# Patient Record
Sex: MALE | Race: BLACK OR AFRICAN AMERICAN | URBAN - METROPOLITAN AREA
[De-identification: names, ages, dates, MRNs, and addresses within clinical notes are randomized per-mention and may not be internally consistent; named-entity substitution may affect disease eponyms.]

---

## 2022-01-03 ENCOUNTER — OFFICE VISIT (OUTPATIENT)
Dept: URBAN - METROPOLITAN AREA CLINIC 105 | Facility: CLINIC | Age: 47
End: 2022-01-03
Payer: OTHER GOVERNMENT

## 2022-01-03 ENCOUNTER — WEB ENCOUNTER (OUTPATIENT)
Dept: URBAN - METROPOLITAN AREA CLINIC 105 | Facility: CLINIC | Age: 47
End: 2022-01-03

## 2022-01-03 VITALS
SYSTOLIC BLOOD PRESSURE: 127 MMHG | DIASTOLIC BLOOD PRESSURE: 88 MMHG | HEART RATE: 67 BPM | BODY MASS INDEX: 28.37 KG/M2 | HEIGHT: 70 IN | WEIGHT: 198.2 LBS | TEMPERATURE: 97 F

## 2022-01-03 DIAGNOSIS — K80.20 GALLSTONES: ICD-10-CM

## 2022-01-03 DIAGNOSIS — R79.89 ABNORMAL LFTS: ICD-10-CM

## 2022-01-03 DIAGNOSIS — I10 HTN (HYPERTENSION), BENIGN: ICD-10-CM

## 2022-01-03 DIAGNOSIS — B20 HIV INFECTION, UNSPECIFIED SYMPTOM STATUS: ICD-10-CM

## 2022-01-03 PROBLEM — 10725009: Status: ACTIVE | Noted: 2022-01-03

## 2022-01-03 PROBLEM — 235919008: Status: ACTIVE | Noted: 2022-01-03

## 2022-01-03 PROCEDURE — 99204 OFFICE O/P NEW MOD 45 MIN: CPT | Performed by: INTERNAL MEDICINE

## 2022-01-03 PROCEDURE — 99244 OFF/OP CNSLTJ NEW/EST MOD 40: CPT | Performed by: INTERNAL MEDICINE

## 2022-01-03 RX ORDER — ASPIRIN 81 MG/1
1 TABLET TABLET, CHEWABLE ORAL ONCE A DAY
Qty: 30 | Status: ACTIVE | COMMUNITY
Start: 2022-01-03

## 2022-01-03 RX ORDER — BISACODYL 5 MG
TAKE 4 TABLET, DELAYED RELEASE (ENTERIC COATED) ORAL
Qty: 4 | OUTPATIENT
Start: 2022-01-03 | End: 2022-01-04

## 2022-01-03 RX ORDER — BICTEGRAVIR SODIUM, EMTRICITABINE, AND TENOFOVIR ALAFENAMIDE FUMARATE 50; 200; 25 MG/1; MG/1; MG/1
TAKE ONE TABLET BY MOUTH ONCE DAILY DIAGNOSIS UNAVAILABLE TABLET ORAL
Qty: 30 | Refills: 0 | Status: ACTIVE | COMMUNITY

## 2022-01-03 RX ORDER — LOSARTAN POTASSIUM 50 MG/1
TAKE ONE TABLET BY MOUTH ONCE DAILY DIAGNOSIS UNAVAILABLE TABLET, FILM COATED ORAL
Qty: 30 | Refills: 0 | Status: ON HOLD | COMMUNITY

## 2022-01-03 RX ORDER — SODIUM, POTASSIUM,MAG SULFATES 17.5-3.13G
177 ML SOLUTION, RECONSTITUTED, ORAL ORAL
Qty: 1 KIT | Refills: 0 | OUTPATIENT
Start: 2022-01-03

## 2022-01-03 NOTE — HPI-TODAY'S VISIT:
Pt comes on referral from Dr. Ubaldo Ocasio. a copy will be sent to the referring MD. pt reports that on recent labs he had elevation of liver enzymes. he has never had a a blood transfusion, and denies any FH of liver issues or IV drug use. - Pt is HIV + and is on treatment. he says that his CD4 count is normal and viral load is undetectable. - Pt has labs from 11/2021 of ast/alt 527/659.  alk-phos of 200.   Total bilirubin is 1.8 Viral load of about 66,000

## 2022-01-06 ENCOUNTER — TELEPHONE ENCOUNTER (OUTPATIENT)
Dept: URBAN - METROPOLITAN AREA CLINIC 105 | Facility: CLINIC | Age: 47
End: 2022-01-06

## 2022-01-06 LAB
ALBUMIN: 4.1
ALKALINE PHOSPHATASE: 116
ALPHA 2-MACROGLOBULINS, QN: 206
ALT (SGPT) P5P: 463
ALT (SGPT): 368
APOLIPOPROTEIN A-1: 110
AST (SGOT): 263
BILIRUBIN, DIRECT: 0.39
BILIRUBIN, TOTAL: 0.9
BILIRUBIN, TOTAL: 1
COMMENT:: (no result)
FIBROSIS SCORE: 0.77
FIBROSIS SCORING:: (no result)
FIBROSIS STAGE: (no result)
GGT: 452
HAPTOGLOBIN: 57
HCV LOG10: 5.37
HEPATITIS C GENOTYPE: (no result)
HEPATITIS C QUANTITATION: (no result)
INTERPRETATIONS:: (no result)
LIMITATIONS:: (no result)
Lab: (no result)
Lab: (no result)
NECROINFLAMM ACTIVITY SCORING:: (no result)
NECROINFLAMMAT ACTIVITY GRADE: (no result)
NECROINFLAMMAT ACTIVITY SCORE: 0.97
PROTEIN, TOTAL: 7.3
TEST INFORMATION:: (no result)

## 2022-01-06 RX ORDER — LEDIPASVIR AND SOFOSBUVIR 90; 400 MG/1; MG/1
1 TABLET TABLET, FILM COATED ORAL ONCE A DAY
Qty: 56 | Refills: 0 | OUTPATIENT
Start: 2022-01-06 | End: 2022-01-16

## 2022-01-13 ENCOUNTER — TELEPHONE ENCOUNTER (OUTPATIENT)
Dept: URBAN - METROPOLITAN AREA CLINIC 105 | Facility: CLINIC | Age: 47
End: 2022-01-13

## 2022-01-20 ENCOUNTER — TELEPHONE ENCOUNTER (OUTPATIENT)
Dept: URBAN - METROPOLITAN AREA CLINIC 105 | Facility: CLINIC | Age: 47
End: 2022-01-20

## 2022-01-20 RX ORDER — LEDIPASVIR AND SOFOSBUVIR 90; 400 MG/1; MG/1
1 TABLET TABLET, FILM COATED ORAL ONCE A DAY
Qty: 28 TABLET | Refills: 0 | OUTPATIENT
Start: 2022-01-20 | End: 2022-02-17

## 2022-02-16 ENCOUNTER — OFFICE VISIT (OUTPATIENT)
Dept: URBAN - METROPOLITAN AREA CLINIC 105 | Facility: CLINIC | Age: 47
End: 2022-02-16
Payer: OTHER GOVERNMENT

## 2022-02-16 DIAGNOSIS — B18.2 CHRONIC HEPATITIS C WITHOUT HEPATIC COMA: ICD-10-CM

## 2022-02-16 DIAGNOSIS — B20 HIV INFECTION, UNSPECIFIED SYMPTOM STATUS: ICD-10-CM

## 2022-02-16 PROBLEM — 128302006: Status: ACTIVE | Noted: 2022-02-16

## 2022-02-16 PROCEDURE — 99213 OFFICE O/P EST LOW 20 MIN: CPT | Performed by: INTERNAL MEDICINE

## 2022-02-16 RX ORDER — LOSARTAN POTASSIUM 50 MG/1
TAKE ONE TABLET BY MOUTH ONCE DAILY DIAGNOSIS UNAVAILABLE TABLET, FILM COATED ORAL
Qty: 30 | Refills: 0 | Status: ON HOLD | COMMUNITY

## 2022-02-16 RX ORDER — SODIUM, POTASSIUM,MAG SULFATES 17.5-3.13G
177 ML SOLUTION, RECONSTITUTED, ORAL ORAL
Qty: 1 KIT | Refills: 0 | Status: ACTIVE | COMMUNITY
Start: 2022-01-03

## 2022-02-16 RX ORDER — ASPIRIN 81 MG/1
1 TABLET TABLET, CHEWABLE ORAL ONCE A DAY
Qty: 30 | Status: ACTIVE | COMMUNITY
Start: 2022-01-03

## 2022-02-16 RX ORDER — LEDIPASVIR AND SOFOSBUVIR 90; 400 MG/1; MG/1
1 TABLET TABLET, FILM COATED ORAL ONCE A DAY
Qty: 28 TABLET | Refills: 0 | Status: ACTIVE | COMMUNITY
Start: 2022-01-20 | End: 2022-02-17

## 2022-02-16 RX ORDER — BICTEGRAVIR SODIUM, EMTRICITABINE, AND TENOFOVIR ALAFENAMIDE FUMARATE 50; 200; 25 MG/1; MG/1; MG/1
TAKE ONE TABLET BY MOUTH ONCE DAILY DIAGNOSIS UNAVAILABLE TABLET ORAL
Qty: 30 | Refills: 0 | Status: ACTIVE | COMMUNITY

## 2022-02-16 NOTE — HPI-TODAY'S VISIT:
Pt comes on referral from Dr. Ubaldo Ocasio. a copy will be sent to the referring MD. pt reports that on recent labs he had elevation of liver enzymes. he has never had a a blood transfusion, and denies any FH of liver issues or IV drug use. - Pt is HIV + and is on treatment. he says that his CD4 count is normal and viral load is undetectable. - Pt has labs from 11/2021 of ast/alt 527/659.  alk-phos of 200.   Total bilirubin is 1.8 Viral load of about 66,000 -  02/16/2022:  We prescribed 8 week course of Harvoni to treat his chronic hepatitis-C. he is on the 2nd bottle.

## 2022-02-18 ENCOUNTER — WEB ENCOUNTER (OUTPATIENT)
Dept: URBAN - METROPOLITAN AREA CLINIC 105 | Facility: CLINIC | Age: 47
End: 2022-02-18

## 2022-02-18 LAB
ALBUMIN: 4.3
ALKALINE PHOSPHATASE: 72
ALT (SGPT): 22
AST (SGOT): 25
BILIRUBIN, DIRECT: 0.2
BILIRUBIN, TOTAL: 0.9
HCV LOG10: (no result)
HEPATITIS C QUANTITATION: (no result)
PROTEIN, TOTAL: 7.3
TEST INFORMATION:: (no result)

## 2022-03-14 ENCOUNTER — OFFICE VISIT (OUTPATIENT)
Dept: URBAN - METROPOLITAN AREA SURGERY CENTER 16 | Facility: SURGERY CENTER | Age: 47
End: 2022-03-14
Payer: OTHER GOVERNMENT

## 2022-03-14 DIAGNOSIS — Z12.11 AVERAGE RISK FOR CRC. DUE TO PT'S CO-MORBID STATE WITH END STAGE DEMENTIA, HIGH RISK FOR ANESTHESIA (PER NEUROLOGY); INABILITY TO TAKE A BOWEL PREP....WOULD NOT ADVISE ANY COLORECTAL CANCER SCREENING INCLUDING STOOL TEST FOR FECAL BLOOD.: ICD-10-CM

## 2022-03-14 PROCEDURE — G8907 PT DOC NO EVENTS ON DISCHARG: HCPCS | Performed by: INTERNAL MEDICINE

## 2022-03-14 PROCEDURE — 45378 DIAGNOSTIC COLONOSCOPY: CPT | Performed by: INTERNAL MEDICINE

## 2022-03-14 RX ORDER — BICTEGRAVIR SODIUM, EMTRICITABINE, AND TENOFOVIR ALAFENAMIDE FUMARATE 50; 200; 25 MG/1; MG/1; MG/1
TAKE ONE TABLET BY MOUTH ONCE DAILY DIAGNOSIS UNAVAILABLE TABLET ORAL
Qty: 30 | Refills: 0 | Status: ACTIVE | COMMUNITY

## 2022-03-14 RX ORDER — ASPIRIN 81 MG/1
1 TABLET TABLET, CHEWABLE ORAL ONCE A DAY
Qty: 30 | Status: ON HOLD | COMMUNITY
Start: 2022-01-03

## 2022-03-14 RX ORDER — SODIUM, POTASSIUM,MAG SULFATES 17.5-3.13G
177 ML SOLUTION, RECONSTITUTED, ORAL ORAL
Qty: 1 KIT | Refills: 0 | Status: ON HOLD | COMMUNITY
Start: 2022-01-03

## 2022-03-14 RX ORDER — LOSARTAN POTASSIUM 50 MG/1
TAKE ONE TABLET BY MOUTH ONCE DAILY DIAGNOSIS UNAVAILABLE TABLET, FILM COATED ORAL
Qty: 30 | Refills: 0 | Status: ACTIVE | COMMUNITY

## 2022-05-03 ENCOUNTER — OFFICE VISIT (OUTPATIENT)
Dept: URBAN - METROPOLITAN AREA CLINIC 105 | Facility: CLINIC | Age: 47
End: 2022-05-03
Payer: OTHER GOVERNMENT

## 2022-05-03 ENCOUNTER — DASHBOARD ENCOUNTERS (OUTPATIENT)
Age: 47
End: 2022-05-03

## 2022-05-03 VITALS
HEART RATE: 71 BPM | HEIGHT: 70 IN | WEIGHT: 197 LBS | BODY MASS INDEX: 28.2 KG/M2 | TEMPERATURE: 97.4 F | DIASTOLIC BLOOD PRESSURE: 76 MMHG | SYSTOLIC BLOOD PRESSURE: 117 MMHG

## 2022-05-03 DIAGNOSIS — B20 HIV INFECTION, UNSPECIFIED SYMPTOM STATUS: ICD-10-CM

## 2022-05-03 DIAGNOSIS — Z86.19 HISTORY OF HEPATITIS C: ICD-10-CM

## 2022-05-03 PROBLEM — 86406008: Status: ACTIVE | Noted: 2022-01-03

## 2022-05-03 PROCEDURE — 99213 OFFICE O/P EST LOW 20 MIN: CPT | Performed by: INTERNAL MEDICINE

## 2022-05-03 RX ORDER — LOSARTAN POTASSIUM 50 MG/1
TAKE ONE TABLET BY MOUTH ONCE DAILY DIAGNOSIS UNAVAILABLE TABLET, FILM COATED ORAL
Qty: 30 | Refills: 0 | Status: ACTIVE | COMMUNITY

## 2022-05-03 RX ORDER — BICTEGRAVIR SODIUM, EMTRICITABINE, AND TENOFOVIR ALAFENAMIDE FUMARATE 50; 200; 25 MG/1; MG/1; MG/1
TAKE ONE TABLET BY MOUTH ONCE DAILY DIAGNOSIS UNAVAILABLE TABLET ORAL
Qty: 30 | Refills: 0 | Status: ACTIVE | COMMUNITY

## 2022-05-03 RX ORDER — ASPIRIN 81 MG/1
1 TABLET TABLET, CHEWABLE ORAL ONCE A DAY
Qty: 30 | Status: ON HOLD | COMMUNITY
Start: 2022-01-03

## 2022-05-03 RX ORDER — SODIUM, POTASSIUM,MAG SULFATES 17.5-3.13G
177 ML SOLUTION, RECONSTITUTED, ORAL ORAL
Qty: 1 KIT | Refills: 0 | Status: ON HOLD | COMMUNITY
Start: 2022-01-03

## 2022-05-03 NOTE — HPI-TODAY'S VISIT:
Pt comes on referral from Dr. Ubaldo Ocasio. a copy will be sent to the referring MD. pt reports that on recent labs he had elevation of liver enzymes. he has never had a a blood transfusion, and denies any FH of liver issues or IV drug use. - Pt is HIV + and is on treatment. he says that his CD4 count is normal and viral load is undetectable. - Pt has labs from 11/2021 of ast/alt 527/659.  alk-phos of 200.   Total bilirubin is 1.8 Viral load of about 66,000 -  02/16/2022:  We prescribed 8 week course of Harvoni to treat his chronic hepatitis-C. he is on the 2nd bottle. - 5/3/2022: he completed the Harvoni course for 8 weeks.

## 2022-05-05 LAB
HCV LOG10: (no result)
HEPATITIS C QUANTITATION: (no result)
TEST INFORMATION:: (no result)